# Patient Record
Sex: MALE | Race: WHITE | NOT HISPANIC OR LATINO | Employment: FULL TIME | URBAN - METROPOLITAN AREA
[De-identification: names, ages, dates, MRNs, and addresses within clinical notes are randomized per-mention and may not be internally consistent; named-entity substitution may affect disease eponyms.]

---

## 2018-07-11 ENCOUNTER — COMMUNICATION - HEALTHEAST (OUTPATIENT)
Dept: TELEHEALTH | Facility: CLINIC | Age: 33
End: 2018-07-11

## 2018-07-11 ENCOUNTER — OFFICE VISIT - HEALTHEAST (OUTPATIENT)
Dept: FAMILY MEDICINE | Facility: CLINIC | Age: 33
End: 2018-07-11

## 2018-07-11 DIAGNOSIS — Z00.00 ROUTINE GENERAL MEDICAL EXAMINATION AT A HEALTH CARE FACILITY: ICD-10-CM

## 2018-07-11 DIAGNOSIS — Z91.02: ICD-10-CM

## 2018-07-11 DIAGNOSIS — Z13.1 SCREENING FOR DIABETES MELLITUS: ICD-10-CM

## 2018-07-11 LAB
ALBUMIN SERPL-MCNC: 4.4 G/DL (ref 3.5–5)
ALP SERPL-CCNC: 65 U/L (ref 45–120)
ALT SERPL W P-5'-P-CCNC: 27 U/L (ref 0–45)
ANION GAP SERPL CALCULATED.3IONS-SCNC: 10 MMOL/L (ref 5–18)
AST SERPL W P-5'-P-CCNC: 23 U/L (ref 0–40)
BILIRUB SERPL-MCNC: 1.8 MG/DL (ref 0–1)
BUN SERPL-MCNC: 13 MG/DL (ref 8–22)
CALCIUM SERPL-MCNC: 9.9 MG/DL (ref 8.5–10.5)
CHLORIDE BLD-SCNC: 104 MMOL/L (ref 98–107)
CHOLEST SERPL-MCNC: 194 MG/DL
CO2 SERPL-SCNC: 26 MMOL/L (ref 22–31)
CREAT SERPL-MCNC: 0.83 MG/DL (ref 0.7–1.3)
FASTING STATUS PATIENT QL REPORTED: YES
GFR SERPL CREATININE-BSD FRML MDRD: >60 ML/MIN/1.73M2
GLUCOSE BLD-MCNC: 89 MG/DL (ref 70–125)
HBA1C MFR BLD: 4.3 % (ref 3.5–6.1)
HDLC SERPL-MCNC: 60 MG/DL
LDLC SERPL CALC-MCNC: 107 MG/DL
POTASSIUM BLD-SCNC: 4.7 MMOL/L (ref 3.5–5)
PROT SERPL-MCNC: 7.3 G/DL (ref 6–8)
SODIUM SERPL-SCNC: 140 MMOL/L (ref 136–145)
TRIGL SERPL-MCNC: 134 MG/DL
TSH SERPL DL<=0.005 MIU/L-ACNC: 1.61 UIU/ML (ref 0.3–5)

## 2018-07-11 ASSESSMENT — MIFFLIN-ST. JEOR: SCORE: 1632.87

## 2018-11-23 ENCOUNTER — COMMUNICATION - HEALTHEAST (OUTPATIENT)
Dept: ADMINISTRATIVE | Facility: CLINIC | Age: 33
End: 2018-11-23

## 2021-06-01 VITALS — HEIGHT: 67 IN | BODY MASS INDEX: 25.44 KG/M2 | WEIGHT: 162.1 LBS

## 2021-06-19 NOTE — PROGRESS NOTES
MALE PREVENTATIVE EXAM    Assessment and Plan:       1. Routine general medical examination at a health care facility  - Lipid Cascade  - Comprehensive Metabolic Panel  - Thyroid Cascade  - JIC LAV    2. Allergy to food additives  - Ambulatory referral to Allergy    3. Screening for diabetes mellitus  - Glycosylated Hemoglobin A1C    Next follow up:  Follow up as needed.    Immunization Review  Adult Imm Review: Declines immunizations today  Wants to come back for the Tetanus shot.  Has quit smoking abouyt 6 months ago.    I discussed the following with the patient:   Adult Healthy Living: Importance of regular exercise  Healthy nutrition  Getting adequate sleep  Stress management  Discussed the issues with redness of the head and face ,and I think it will be necessary to have him see an Allergist for possible testing.Was difficult to pinpoint what is causing the reaction.Maybe a food additive since he does not have the symptoms when he eats at home.  Also discussed healthcare maintenance.Labs were ordered and will inform him when available.    Subjective:   Chief Complaint: Teddy Chapman is an 33 y.o. male here for a preventative health visit.     HPI:  Comes in for a routine physical exam, last physical was a long time ago.He states he is healthy and does not have any chronic concerns.He does have a reaction that has been going on for the past 6 months.Notes when he eats especially at a restaurant, he will note suddenly having redness and flushing on the face and neck and feels tight.  He will have a sensation of being wound up and hot.  The first time it happened it lasted up to an hour.  There is also a relationship with use of alcohol.  He had several episodes and decided to stop eating out.  Noted that each time he eats at home he does not really have any of these reactions.  He thinks that it could be associated with increase in salt or some kind of food additives.  Noted eating healthy at home and not  having the issues.  He noted drinking alcohol on not having any reaction when he drinks alcohol alone.  He does have this irrespective of the kind of food that he eats outside even up to sandwiches and salads.    He does not have any known seasonal type allergies environmental type allergies.  He did stop smoking about 6 months ago.  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    Healthy Habits  Are you taking a daily aspirin? No  Do you typically exercising at least 40 min, 3-4 times per week?  NO  Do you usually eat at least 4 servings of fruit and vegetables a day, include whole grains and fiber and avoid regularly eating high fat foods? Yes  Have you had an eye exam in the past two years? NO  Do you see a dentist twice per year? NO  Do you have any concerns regarding sleep? No    Safety Screen  If you own firearms, are they secured in a locked gun cabinet or with trigger locks? The patient declines to answer  Do you feel you are safe where you are living?: Yes (7/11/2018  7:52 AM)  Do you feel you are safe in your relationship(s)?: Yes (7/11/2018  7:52 AM)    Review of Systems:  Please see above.  The rest of the review of systems are negative for all systems.   He denies having any cough or difficulty breathing.  He feels tight in his chest intermittently.  He denies any abdominal discomfort, no diarrhea no constipation.  He does not have any other abdominal concerns.  There is no chest pains or shortness of breath.  He denies having any rash.  Has had no weight loss.  Denies any muscle pain and no noted joint aches or swellings.  Rest of review of system is normal.    Cancer Screening     Patient has no health maintenance due at this time          Patient Care Team:  Yahaira Farmer MD as PCP - General (Family Medicine)        History     Reviewed By Date/Time Sections Reviewed    Stacy Mcconnell CMA 7/11/2018  7:51 AM Tobacco, Alcohol, Drug Use, Sexual Activity     "        Objective:   Vital Signs:   Visit Vitals     BP (!) 136/92 (Patient Site: Left Arm, Patient Position: Sitting, Cuff Size: Adult Regular)     Pulse 100     Ht 5' 7.25\" (1.708 m)     Wt 162 lb 1.6 oz (73.5 kg)     BMI 25.2 kg/m2        Physical Exam:  General Appearance: Alert, cooperative, no distress, appears stated age  Head: Normocephalic, without obvious abnormality, atraumatic  Eyes: PERRL, conjunctiva/corneas clear, EOM's intact  Ears: Normal TM's and external ear canals, both ears  Nose: Nares normal, septum midline,mucosa normal, no drainage  Throat: Lips, mucosa, and tongue normal; teeth and gums normal  Neck: Supple, symmetrical, trachea midline, no adenopathy;  thyroid: not enlarged, symmetric, no tenderness/mass/nodules; no carotid bruit or JVD  Back: Symmetric, no curvature, ROM normal, no CVA tenderness  Lungs: Clear to auscultation bilaterally, respirations unlabored  Heart: Regular rate and rhythm, S1 and S2 normal, no murmur, rub, or gallop,  Abdomen: Soft, non-tender, bowel sounds active all four quadrants,  no masses, no organomegaly  Musculoskeletal: Normal range of motion. No joint swelling or deformity.   Extremities: Extremities normal, atraumatic, no cyanosis or edema  Skin: Skin color, texture, turgor normal, no rashes or lesions  Lymph nodes: Cervical, supraclavicular, and axillary nodes normal  Neurologic: He is alert. He has normal reflexes.   Psychiatric: He has a normal mood and affect.          Medication List      Notice  As of 7/11/2018  9:10 AM    You have not been prescribed any medications.          Additional Screenings Completed Today:     "

## 2021-08-21 ENCOUNTER — HEALTH MAINTENANCE LETTER (OUTPATIENT)
Age: 36
End: 2021-08-21

## 2021-10-16 ENCOUNTER — HEALTH MAINTENANCE LETTER (OUTPATIENT)
Age: 36
End: 2021-10-16

## 2022-10-01 ENCOUNTER — HEALTH MAINTENANCE LETTER (OUTPATIENT)
Age: 37
End: 2022-10-01

## 2022-10-11 ENCOUNTER — TELEPHONE (OUTPATIENT)
Dept: FAMILY MEDICINE | Facility: CLINIC | Age: 37
End: 2022-10-11

## 2022-10-11 ENCOUNTER — OFFICE VISIT (OUTPATIENT)
Dept: FAMILY MEDICINE | Facility: CLINIC | Age: 37
End: 2022-10-11
Payer: COMMERCIAL

## 2022-10-11 VITALS
OXYGEN SATURATION: 98 % | RESPIRATION RATE: 20 BRPM | TEMPERATURE: 98.8 F | HEIGHT: 67 IN | HEART RATE: 81 BPM | WEIGHT: 161.2 LBS | SYSTOLIC BLOOD PRESSURE: 120 MMHG | BODY MASS INDEX: 25.3 KG/M2 | DIASTOLIC BLOOD PRESSURE: 90 MMHG

## 2022-10-11 DIAGNOSIS — Z11.59 NEED FOR HEPATITIS C SCREENING TEST: ICD-10-CM

## 2022-10-11 DIAGNOSIS — Z00.00 ANNUAL PHYSICAL EXAM: ICD-10-CM

## 2022-10-11 DIAGNOSIS — R10.13 EPIGASTRIC PAIN: ICD-10-CM

## 2022-10-11 DIAGNOSIS — D22.9 ATYPICAL NEVI: ICD-10-CM

## 2022-10-11 DIAGNOSIS — R35.89 POLYURIA: Primary | ICD-10-CM

## 2022-10-11 DIAGNOSIS — I10 HYPERTENSION, UNSPECIFIED TYPE: ICD-10-CM

## 2022-10-11 DIAGNOSIS — Z11.4 SCREENING FOR HIV (HUMAN IMMUNODEFICIENCY VIRUS): ICD-10-CM

## 2022-10-11 DIAGNOSIS — Z01.84 IMMUNITY STATUS TESTING: ICD-10-CM

## 2022-10-11 LAB
ALBUMIN SERPL BCG-MCNC: 4.8 G/DL (ref 3.5–5.2)
ALBUMIN UR-MCNC: NEGATIVE MG/DL
ALP SERPL-CCNC: 75 U/L (ref 40–129)
ALT SERPL W P-5'-P-CCNC: 20 U/L (ref 10–50)
ANION GAP SERPL CALCULATED.3IONS-SCNC: 11 MMOL/L (ref 7–15)
APPEARANCE UR: CLEAR
AST SERPL W P-5'-P-CCNC: 22 U/L (ref 10–50)
BILIRUB SERPL-MCNC: 1 MG/DL
BILIRUB UR QL STRIP: NEGATIVE
BUN SERPL-MCNC: 11.3 MG/DL (ref 6–20)
CALCIUM SERPL-MCNC: 9.9 MG/DL (ref 8.6–10)
CHLORIDE SERPL-SCNC: 101 MMOL/L (ref 98–107)
CHOLEST SERPL-MCNC: 164 MG/DL
COLOR UR AUTO: YELLOW
CREAT SERPL-MCNC: 0.76 MG/DL (ref 0.67–1.17)
DEPRECATED HCO3 PLAS-SCNC: 28 MMOL/L (ref 22–29)
ERYTHROCYTE [DISTWIDTH] IN BLOOD BY AUTOMATED COUNT: 12.6 % (ref 10–15)
GFR SERPL CREATININE-BSD FRML MDRD: >90 ML/MIN/1.73M2
GLUCOSE SERPL-MCNC: 96 MG/DL (ref 70–99)
GLUCOSE UR STRIP-MCNC: NEGATIVE MG/DL
HBA1C MFR BLD: 4.3 % (ref 0–5.6)
HCT VFR BLD AUTO: 41.4 % (ref 40–53)
HDLC SERPL-MCNC: 82 MG/DL
HGB BLD-MCNC: 14.9 G/DL (ref 13.3–17.7)
HGB UR QL STRIP: NEGATIVE
KETONES UR STRIP-MCNC: NEGATIVE MG/DL
LDLC SERPL CALC-MCNC: 74 MG/DL
LEUKOCYTE ESTERASE UR QL STRIP: NEGATIVE
MCH RBC QN AUTO: 33.4 PG (ref 26.5–33)
MCHC RBC AUTO-ENTMCNC: 36 G/DL (ref 31.5–36.5)
MCV RBC AUTO: 93 FL (ref 78–100)
NITRATE UR QL: NEGATIVE
NONHDLC SERPL-MCNC: 82 MG/DL
PH UR STRIP: 6 [PH] (ref 5–8)
PLATELET # BLD AUTO: 171 10E3/UL (ref 150–450)
POTASSIUM SERPL-SCNC: 4.9 MMOL/L (ref 3.4–5.3)
PROT SERPL-MCNC: 7.4 G/DL (ref 6.4–8.3)
RBC # BLD AUTO: 4.46 10E6/UL (ref 4.4–5.9)
SODIUM SERPL-SCNC: 140 MMOL/L (ref 136–145)
SP GR UR STRIP: 1.02 (ref 1–1.03)
TRIGL SERPL-MCNC: 41 MG/DL
UROBILINOGEN UR STRIP-ACNC: 0.2 E.U./DL
WBC # BLD AUTO: 5.9 10E3/UL (ref 4–11)

## 2022-10-11 PROCEDURE — 90471 IMMUNIZATION ADMIN: CPT | Performed by: STUDENT IN AN ORGANIZED HEALTH CARE EDUCATION/TRAINING PROGRAM

## 2022-10-11 PROCEDURE — 83036 HEMOGLOBIN GLYCOSYLATED A1C: CPT | Performed by: STUDENT IN AN ORGANIZED HEALTH CARE EDUCATION/TRAINING PROGRAM

## 2022-10-11 PROCEDURE — 99385 PREV VISIT NEW AGE 18-39: CPT | Performed by: STUDENT IN AN ORGANIZED HEALTH CARE EDUCATION/TRAINING PROGRAM

## 2022-10-11 PROCEDURE — 86706 HEP B SURFACE ANTIBODY: CPT | Performed by: STUDENT IN AN ORGANIZED HEALTH CARE EDUCATION/TRAINING PROGRAM

## 2022-10-11 PROCEDURE — 80061 LIPID PANEL: CPT | Performed by: STUDENT IN AN ORGANIZED HEALTH CARE EDUCATION/TRAINING PROGRAM

## 2022-10-11 PROCEDURE — 81003 URINALYSIS AUTO W/O SCOPE: CPT | Performed by: STUDENT IN AN ORGANIZED HEALTH CARE EDUCATION/TRAINING PROGRAM

## 2022-10-11 PROCEDURE — 80053 COMPREHEN METABOLIC PANEL: CPT | Performed by: STUDENT IN AN ORGANIZED HEALTH CARE EDUCATION/TRAINING PROGRAM

## 2022-10-11 PROCEDURE — 90715 TDAP VACCINE 7 YRS/> IM: CPT | Performed by: STUDENT IN AN ORGANIZED HEALTH CARE EDUCATION/TRAINING PROGRAM

## 2022-10-11 PROCEDURE — 99214 OFFICE O/P EST MOD 30 MIN: CPT | Mod: 25 | Performed by: STUDENT IN AN ORGANIZED HEALTH CARE EDUCATION/TRAINING PROGRAM

## 2022-10-11 PROCEDURE — 85027 COMPLETE CBC AUTOMATED: CPT | Performed by: STUDENT IN AN ORGANIZED HEALTH CARE EDUCATION/TRAINING PROGRAM

## 2022-10-11 PROCEDURE — 36415 COLL VENOUS BLD VENIPUNCTURE: CPT | Performed by: STUDENT IN AN ORGANIZED HEALTH CARE EDUCATION/TRAINING PROGRAM

## 2022-10-11 RX ORDER — RIBOFLAVIN (VITAMIN B2) 100 MG
100 TABLET ORAL 3 TIMES DAILY
COMMUNITY

## 2022-10-11 RX ORDER — MULTIVITAMIN,THERAPEUTIC
1 TABLET ORAL DAILY
COMMUNITY

## 2022-10-11 ASSESSMENT — ENCOUNTER SYMPTOMS
MYALGIAS: 0
COUGH: 0
CHILLS: 0
SHORTNESS OF BREATH: 1
PALPITATIONS: 0
DIARRHEA: 0
HEARTBURN: 0
DIZZINESS: 0
NERVOUS/ANXIOUS: 0
WEAKNESS: 0
FEVER: 0
FREQUENCY: 1
SORE THROAT: 0
HEMATURIA: 0
JOINT SWELLING: 0
HEMATOCHEZIA: 0
CONSTIPATION: 0
ARTHRALGIAS: 0
EYE PAIN: 0
NAUSEA: 0
HEADACHES: 0
ABDOMINAL PAIN: 0
DYSURIA: 0
PARESTHESIAS: 0

## 2022-10-11 ASSESSMENT — PAIN SCALES - GENERAL: PAINLEVEL: EXTREME PAIN (8)

## 2022-10-11 NOTE — PROGRESS NOTES
Assessment/ Plan     37-year-old male with unremarkable past medical history who presents for annual physical exam and has several concerns today.    1. Screening for HIV (human immunodeficiency virus)  2. Need for hepatitis C screening test  Patient refused    3. Polyuria  Patient having significant polyuria goes to the bathroom proximately once every hour to every half an hour.  We will screen for diabetes as well as obtain a UA.  We will also obtain a CMP as below.  Did not describe significant symptoms of excessive thirst but if this is unrevealing and having worsening symptoms may need to do something like a urinary journal and measure the amount of fluid he is urinating to consider more rare etiologies such as diabetes insipidus.  - Hemoglobin A1c; Future  - UA Macro with Reflex to Micro and Culture - lab collect; Future  - Hemoglobin A1c  - UA Macro with Reflex to Micro and Culture - lab collect    4. Annual physical exam  - Comprehensive metabolic panel (BMP + Alb, Alk Phos, ALT, AST, Total. Bili, TP); Future  - CBC with platelets; Future  - Lipid panel reflex to direct LDL Fasting; Future  - Comprehensive metabolic panel (BMP + Alb, Alk Phos, ALT, AST, Total. Bili, TP)  - CBC with platelets  - Lipid panel reflex to direct LDL Fasting    5. Epigastric pain  Patient describing many chest and upper abdomen symptoms of pain and tightness.  I think given patient's young age and no significant family history of heart disease as well as association of the symptoms with food I am most suspicious for gastritis or other related etiologies.  We will obtain an H. pylori and I recommended he trial omeprazole daily.  Did give him a GI cocktail here in clinic which did not really not relieve the pressure he has in his upper abdomen.  If no improvement after couple week's omeprazole I recommend he go and get an EGD and would also consider a CT scan of his abdomen.  - Helicobacter pylori Antigen Stool; Future  - lidocaine  "(viscous) (XYLOCAINE) 2 % 15 mL, alum & mag hydroxide-simethicone (MAALOX) 15 mL GI Cocktail  - omeprazole (PRILOSEC) 20 MG DR capsule; Take 1 capsule (20 mg) by mouth daily  Dispense: 30 capsule; Refill: 1    6. Immunity status testing  - Hepatitis B Surface Antibody; Future  - Hepatitis B Surface Antibody    7. Hypertension, unspecified type  Patient's blood pressure borderline high today at 120/90.  I recommended he obtain an automatic cuff and start checking his blood pressure couple times a week.  He will inform me if above goal of 140/90.    8. Atypical nevi  Over right arm.  Recommended biopsy given size and appearance    Follow-up in:     Devante Perry MD    Subjective:     Teddy Chapman is a 37 year old male who presents for an annual exam.     Chief Complaint   Patient presents with     Physical     Drinks energy drinks-got a headache, vision declining, spots, dizzy after about an hour drinking energy drink then went away. This happened once about a month ago. Chest discomfort on left side of chest. Lower left side of back at end of rib cage pain and discomfort. All of this for about a month or so. Labs. Patient is fasting. Food allergy? Chest gets tights after eating.      Patient tells me he has discomfort in his chest on the left side. Describes as a sharp feeling sometime \"smoldering sensation\".  Also having a pain in his left flank. Seems to be worse in the morning and improves some during the day. He notes that after he eats he feels tightness in his chest and armpits. He doesn't;t exercise an \"drinks a lot\". He drinks daily 5-6 during the weekends and on the weekends he drinks more. He has never withdrawn he thinks. No nausea or vomiting. Former smoker. He drinks an energy drinks about once per day. He also drinks coffee.  Had an episode where he had a headache and difficulty focusing with his vision. No melena or hematochezia.     Answers for HPI/ROS submitted by the patient on " 10/11/2022  Frequency of exercise:: None  Getting at least 3 servings of Calcium per day:: Yes  Diet:: Regular (no restrictions)  Taking medications regularly:: Not Applicable  Medication side effects:: Not applicable  Bi-annual eye exam:: NO  Dental care twice a year:: NO  Sleep apnea or symptoms of sleep apnea:: None  abdominal pain: No  Blood in stool: No  Blood in urine: No  chest pain: Yes  chills: No  congestion: No  constipation: No  cough: No  diarrhea: No  dizziness: No  ear pain: No  eye pain: No  nervous/anxious: No  fever: No  frequency: Yes  genital sores: No  headaches: No  hearing loss: No  heartburn: No  arthralgias: No  joint swelling: No  peripheral edema: No  mood changes: No  myalgias: No  nausea: No  dysuria: No  palpitations: No  Skin sensation changes: No  sore throat: No  urgency: No  rash: No  shortness of breath: Yes  visual disturbance: No  weakness: No  impotence: No  penile discharge: No  Additional concerns today:: Yes    Immunization History   Administered Date(s) Administered     Hep B, Peds or Adolescent 02/23/2001     Immunization status: due today.     Current Outpatient Medications   Medication Sig Dispense Refill     multivitamin, therapeutic (THERA-VIT) TABS tablet Take 1 tablet by mouth daily       vitamin C (ASCORBIC ACID) 100 MG tablet Take 1 tablet (100 mg) by mouth 3 times daily       No past medical history on file.  Past Surgical History:   Procedure Laterality Date     TONSILLECTOMY & ADENOIDECTOMY       WISDOM TOOTH EXTRACTION       Patient has no known allergies.  Family History   Adopted: Yes   Problem Relation Age of Onset     Breast Cancer Mother      Cerebrovascular Disease Mother      Thrombosis Mother      Social History     Socioeconomic History     Marital status:      Spouse name: Not on file     Number of children: Not on file     Years of education: Not on file     Highest education level: Not on file   Occupational History     Not on file   Tobacco  "Use     Smoking status: Former     Types: Cigarettes     Quit date: 2018     Years since quittin.7     Smokeless tobacco: Never   Substance and Sexual Activity     Alcohol use: Yes     Drug use: No     Sexual activity: Yes     Partners: Female   Other Topics Concern     Not on file   Social History Narrative     Not on file     Social Determinants of Health     Financial Resource Strain: Not on file   Food Insecurity: Not on file   Transportation Needs: Not on file   Physical Activity: Not on file   Stress: Not on file   Social Connections: Not on file   Intimate Partner Violence: Not on file   Housing Stability: Not on file       Review of Systems  Complete ROS negative except as noted in the HPI    Objective:      Vitals:    10/11/22 1050   BP: (!) 120/90   BP Location: Right arm   Patient Position: Sitting   Cuff Size: Adult Regular   Pulse: 81   Resp: 20   Temp: 98.8  F (37.1  C)   TempSrc: Temporal   SpO2: 98%   Weight: 73.1 kg (161 lb 3.2 oz)   Height: 1.702 m (5' 7\")       General appearance: Alert, cooperative, no distress, appears stated age  Head: Normocephalic, atraumatic, without obvious abnormality  EARS: TM's gray dull with structures seen bilaterally  Eyes: Pupils equal round, reactive.  Conjunctiva clear.  Nose: Nares normal, no drainage.  Throat: Lips, mucosa, tongue normal mucosa pink and moist  Neck: Supple, symmetric, trachea midline, no adenopathy.  No thyroid enlargement, tenderness or nodules.    Lungs: Clear to auscultation bilaterally, no wheezing or crackles present.  Respirations unlabored  Heart: Regular rate and rhythm, normal S1 and S2, no murmur, rub or gallop.  Abdomen: Soft, nontender, nondistended.  Bowel sounds active in all 4 quadrants.  No masses or organomegaly.  Extremities: Extremities normal, atraumatic.  No cyanosis or edema.  Skin: Skin color, texture, turgor normal no rashes or lesions on limited skin exam  Neurologic: CN II through XII intact, normal " strength.      Devante Barragan MD

## 2022-10-11 NOTE — PATIENT INSTRUCTIONS
Blood pressure: <140/90   a blood pressure automatic cuff, check blood pressure couple times a week

## 2022-10-11 NOTE — TELEPHONE ENCOUNTER
Patient needs a mole biopsy procedure scheduled. Called and left message for patient to call back to get this scheduled.

## 2022-10-12 LAB
HBV SURFACE AB SERPL IA-ACNC: 96.92 M[IU]/ML
HBV SURFACE AB SERPL IA-ACNC: REACTIVE M[IU]/ML

## 2022-10-12 NOTE — RESULT ENCOUNTER NOTE
Teddy  Your results from your recent clinic visit show:  Your Urinalysis (UA) was normal with no significant infection or blood seen  Your Hep B testing shows you are immune to this  Your CMP was normal with normal electrolytes, kidney function, and liver function  Your cholesterol was normal  Your CBC is normal with no anemia or signs of infection seen  Your hemoglobin A1c was normal.  This is a test looking for diabetes and measures your average blood sugars over the last 3 months.    If you have more questions please call the clinic at [phone] or send me a [employer] message    Dr. Devante Perry

## 2022-10-13 ENCOUNTER — LAB (OUTPATIENT)
Dept: LAB | Facility: CLINIC | Age: 37
End: 2022-10-13
Payer: COMMERCIAL

## 2022-10-13 DIAGNOSIS — R10.13 EPIGASTRIC PAIN: ICD-10-CM

## 2022-10-13 PROCEDURE — 87338 HPYLORI STOOL AG IA: CPT

## 2022-10-14 LAB — H PYLORI AG STL QL IA: NEGATIVE

## 2022-10-14 NOTE — RESULT ENCOUNTER NOTE
Teddy,  Your results from your recent clinic visit show:  Your H. Pylori test was normal or negative    If you have more questions please call the clinic at [phone] or send me a [employer] message    Dr. Devante Perry

## 2023-12-24 ENCOUNTER — HEALTH MAINTENANCE LETTER (OUTPATIENT)
Age: 38
End: 2023-12-24

## 2024-08-23 ENCOUNTER — DOCUMENTATION ONLY (OUTPATIENT)
Dept: SLEEP MEDICINE | Facility: CLINIC | Age: 39
End: 2024-08-23
Payer: COMMERCIAL

## 2024-08-23 DIAGNOSIS — G47.33 OSA (OBSTRUCTIVE SLEEP APNEA): Primary | ICD-10-CM

## 2024-08-23 NOTE — PROGRESS NOTES
Patient was offered choice of vendor and chose [facility].  Patient Teddy Chapman was set up at [facility] on August 23, 2024. Patient received a Resmed Airsense 10 Pressures were set at  5-15 cm H2O.   Patient s ramp is 5 cm H2O for Auto and FLEX/EPR is 2.  Patient received a Resmed Mask name: AIRFIT P10  Pillow mask size Small, heated tubing and heated humidifier.  Patient has the following compliance requirements: none    Kasey Morton

## 2024-08-26 ENCOUNTER — DOCUMENTATION ONLY (OUTPATIENT)
Dept: SLEEP MEDICINE | Facility: CLINIC | Age: 39
End: 2024-08-26
Payer: COMMERCIAL

## 2024-08-26 NOTE — PROGRESS NOTES
3 day Sleep therapy management telephone visit    Diagnostic AHI:          LEFT VOICE MESSAGE FOR PATIENT TO RETURN CALL      Order settings:  CPAP MIN CPAP MAX   5 cm H2O 15 cm H2O         Device settings:  CPAP MIN CPAP MAX EPR RESMED SOFT RESPONSE SETTING   5 cm  H20 15 cm  H20 2 OFF         Patient has the following upcoming sleep appts:      Replacement device: No  STM ordered by provider: Yes     Total time spent on accessing and  interpreting remote patient PAP therapy data  10 minutes    Total time spent counseling, coaching  and reviewing PAP therapy data with patient  0 minutes

## 2025-01-18 ENCOUNTER — HEALTH MAINTENANCE LETTER (OUTPATIENT)
Age: 40
End: 2025-01-18